# Patient Record
Sex: FEMALE | Race: WHITE | NOT HISPANIC OR LATINO
[De-identification: names, ages, dates, MRNs, and addresses within clinical notes are randomized per-mention and may not be internally consistent; named-entity substitution may affect disease eponyms.]

---

## 2017-06-21 ENCOUNTER — APPOINTMENT (OUTPATIENT)
Dept: OBGYN | Facility: HOSPITAL | Age: 42
End: 2017-06-21

## 2017-06-22 ENCOUNTER — CHART COPY (OUTPATIENT)
Age: 42
End: 2017-06-22

## 2017-08-01 ENCOUNTER — APPOINTMENT (OUTPATIENT)
Dept: OBGYN | Facility: HOSPITAL | Age: 42
End: 2017-08-01
Payer: MEDICARE

## 2017-08-01 ENCOUNTER — OUTPATIENT (OUTPATIENT)
Dept: OUTPATIENT SERVICES | Facility: HOSPITAL | Age: 42
LOS: 1 days | End: 2017-08-01

## 2017-08-01 VITALS
SYSTOLIC BLOOD PRESSURE: 111 MMHG | HEART RATE: 94 BPM | HEIGHT: 60 IN | DIASTOLIC BLOOD PRESSURE: 72 MMHG | BODY MASS INDEX: 17.68 KG/M2 | WEIGHT: 90.06 LBS

## 2017-08-01 DIAGNOSIS — F84.0 AUTISTIC DISORDER: ICD-10-CM

## 2017-08-01 DIAGNOSIS — Z01.419 ENCOUNTER FOR GYNECOLOGICAL EXAMINATION (GENERAL) (ROUTINE) W/OUT ABNORMAL FINDINGS: ICD-10-CM

## 2017-08-01 PROCEDURE — 99214 OFFICE O/P EST MOD 30 MIN: CPT

## 2017-08-02 DIAGNOSIS — F84.0 AUTISTIC DISORDER: ICD-10-CM

## 2017-08-02 DIAGNOSIS — Z01.419 ENCOUNTER FOR GYNECOLOGICAL EXAMINATION (GENERAL) (ROUTINE) WITHOUT ABNORMAL FINDINGS: ICD-10-CM

## 2017-08-02 DIAGNOSIS — Z30.41 ENCOUNTER FOR SURVEILLANCE OF CONTRACEPTIVE PILLS: ICD-10-CM

## 2018-01-22 ENCOUNTER — OUTPATIENT (OUTPATIENT)
Dept: OUTPATIENT SERVICES | Facility: HOSPITAL | Age: 43
LOS: 1 days | End: 2018-01-22

## 2018-01-22 VITALS
WEIGHT: 89.95 LBS | RESPIRATION RATE: 16 BRPM | HEART RATE: 90 BPM | SYSTOLIC BLOOD PRESSURE: 90 MMHG | DIASTOLIC BLOOD PRESSURE: 60 MMHG | HEIGHT: 60 IN

## 2018-01-22 DIAGNOSIS — F79 UNSPECIFIED INTELLECTUAL DISABILITIES: ICD-10-CM

## 2018-01-22 LAB
BUN SERPL-MCNC: 15 MG/DL — SIGNIFICANT CHANGE UP (ref 7–23)
CALCIUM SERPL-MCNC: 8.6 MG/DL — SIGNIFICANT CHANGE UP (ref 8.4–10.5)
CHLORIDE SERPL-SCNC: 102 MMOL/L — SIGNIFICANT CHANGE UP (ref 98–107)
CO2 SERPL-SCNC: 23 MMOL/L — SIGNIFICANT CHANGE UP (ref 22–31)
CREAT SERPL-MCNC: 0.55 MG/DL — SIGNIFICANT CHANGE UP (ref 0.5–1.3)
GLUCOSE SERPL-MCNC: 94 MG/DL — SIGNIFICANT CHANGE UP (ref 70–99)
HCG SERPL-ACNC: < 5 MIU/ML — SIGNIFICANT CHANGE UP
HCT VFR BLD CALC: 37.2 % — SIGNIFICANT CHANGE UP (ref 34.5–45)
HGB BLD-MCNC: 12.1 G/DL — SIGNIFICANT CHANGE UP (ref 11.5–15.5)
MCHC RBC-ENTMCNC: 30.2 PG — SIGNIFICANT CHANGE UP (ref 27–34)
MCHC RBC-ENTMCNC: 32.5 % — SIGNIFICANT CHANGE UP (ref 32–36)
MCV RBC AUTO: 92.8 FL — SIGNIFICANT CHANGE UP (ref 80–100)
NRBC # FLD: 0 — SIGNIFICANT CHANGE UP
PLATELET # BLD AUTO: 239 K/UL — SIGNIFICANT CHANGE UP (ref 150–400)
PMV BLD: 11 FL — SIGNIFICANT CHANGE UP (ref 7–13)
POTASSIUM SERPL-MCNC: 3.9 MMOL/L — SIGNIFICANT CHANGE UP (ref 3.5–5.3)
POTASSIUM SERPL-SCNC: 3.9 MMOL/L — SIGNIFICANT CHANGE UP (ref 3.5–5.3)
RBC # BLD: 4.01 M/UL — SIGNIFICANT CHANGE UP (ref 3.8–5.2)
RBC # FLD: 12.4 % — SIGNIFICANT CHANGE UP (ref 10.3–14.5)
SODIUM SERPL-SCNC: 139 MMOL/L — SIGNIFICANT CHANGE UP (ref 135–145)
WBC # BLD: 6.77 K/UL — SIGNIFICANT CHANGE UP (ref 3.8–10.5)
WBC # FLD AUTO: 6.77 K/UL — SIGNIFICANT CHANGE UP (ref 3.8–10.5)

## 2018-01-22 NOTE — H&P PST ADULT - PMH
Autism    Mental retardation  profound Autism    IBS (irritable bowel syndrome)    Mental retardation  profound

## 2018-01-22 NOTE — H&P PST ADULT - PROBLEM SELECTOR PLAN 1
scheduled examination under anesthesia, papsmear, pelvic sonogram, breast examination on 1/30/2018.   preop instructions sent to facility  pending copy of medical eval  unable to obtain temperature in PST  Brother Vijay Laguerre to sign consent  contact info [phone]

## 2018-01-22 NOTE — H&P PST ADULT - HISTORY OF PRESENT ILLNESS
42 y/o 43 y/o female with h/o profound mental retardation & autism presents for preop eval for scheduled examination under anesthesia, papsmear, pelvic sonogram, breast examination on 1/30/2018.  Pt accompanied by staff - Mallorie Lea.  Routine procedure per Annabella HALL.

## 2018-01-22 NOTE — H&P PST ADULT - NSANTHOSAYNRD_GEN_A_CORE
No. ROBBIE screening performed.  STOP BANG Legend: 0-2 = LOW Risk; 3-4 = INTERMEDIATE Risk; 5-8 = HIGH Risk

## 2018-01-30 ENCOUNTER — RESULT REVIEW (OUTPATIENT)
Age: 43
End: 2018-01-30

## 2018-01-30 ENCOUNTER — TRANSCRIPTION ENCOUNTER (OUTPATIENT)
Age: 43
End: 2018-01-30

## 2018-01-30 ENCOUNTER — OUTPATIENT (OUTPATIENT)
Dept: OUTPATIENT SERVICES | Facility: HOSPITAL | Age: 43
LOS: 1 days | Discharge: HOME | End: 2018-01-30
Payer: MEDICARE

## 2018-01-30 VITALS
HEIGHT: 60 IN | SYSTOLIC BLOOD PRESSURE: 107 MMHG | WEIGHT: 89.95 LBS | OXYGEN SATURATION: 100 % | HEART RATE: 89 BPM | RESPIRATION RATE: 16 BRPM | DIASTOLIC BLOOD PRESSURE: 63 MMHG | TEMPERATURE: 98 F

## 2018-01-30 VITALS
OXYGEN SATURATION: 100 % | SYSTOLIC BLOOD PRESSURE: 108 MMHG | TEMPERATURE: 98 F | DIASTOLIC BLOOD PRESSURE: 68 MMHG | RESPIRATION RATE: 16 BRPM | HEART RATE: 89 BPM

## 2018-01-30 DIAGNOSIS — F79 UNSPECIFIED INTELLECTUAL DISABILITIES: ICD-10-CM

## 2018-01-30 LAB
GRAM STN WND: SIGNIFICANT CHANGE UP
SPECIMEN SOURCE: SIGNIFICANT CHANGE UP

## 2018-01-30 PROCEDURE — 57410 PELVIC EXAMINATION: CPT

## 2018-01-30 PROCEDURE — 76857 US EXAM PELVIC LIMITED: CPT | Mod: 26

## 2018-01-30 NOTE — ASU DISCHARGE PLAN (ADULT/PEDIATRIC). - MEDICATION SUMMARY - MEDICATIONS TO TAKE
I will START or STAY ON the medications listed below when I get home from the hospital:    multivitamin with minerals  -- 1 tab daily  -- Indication: For as prescribed    Viorele  -- 28 day tablet - by mouth 1 tab daily   -- Indication: For as prescribed    insect repellent spray  -- apply to exposed body areas 3x/day prn  -- Indication: For as prescribed    sunscreen SPF 30  -- lotion - Apply generously to exposed areas - Reapply at 2 hr intervals prn  -- Indication: For as prescribed    clindamycin phosphate  -- 1% Lotion - Apply a thin layer on skin to face in am   -- Indication: For as prescribed    sodium sulf-sulfur cream  -- apply a samll amount on skin to lesions   -- Indication: For as prescribed    Docusil  -- 100 mg soft gel  1 cap po 3x/day   -- Indication: For as prescribed    naproxen 500 mg oral tablet  -- 1 tab(s) by mouth 2 times a day prn  -- Indication: For as prescribed    LORazepam 1 mg oral tablet  -- Take 1 tab po 1 hour prior to visit - prn  -- Indication: For as prescribed    PARoxetine 30 mg oral tablet  -- 1 tab(s) by mouth once a day  -- Indication: For as prescribed    selenium sulfide 2.5% topical shampoo  -- use daily   -- Indication: For as prescribed    clotrimazole 1% topical cream  -- Apply on skin to affected area 2 times a day PRN  -- Indication: For as prescribed    Blistex topical ointment  -- Apply on skin to affected area 2 times a day  -- Indication: For as prescribed    Retin-A 0.1% topical cream  -- Apply on skin to affected area once a day (at bedtime)  -- Indication: For as prescribed    triamcinolone 0.1% topical cream  -- apply on skin as directed 2x/day to face, arms, legs and back   -- Indication: For as prescribed    Linzess 145 mcg oral capsule  -- 1 cap(s) by mouth once a day  -- Indication: For as prescribed    Senna Lax 8.6 mg oral tablet  -- 2 tab(s) by mouth once a day (at bedtime)  -- Indication: For as prescribed    clindamycin 75 mg oral capsule  -- 1 cap(s) by mouth once a day   -- Finish all this medication unless otherwise directed by prescriber.  Medication should be taken with plenty of water.    -- Indication: For as prescribed I will START or STAY ON the medications listed below when I get home from the hospital:    multivitamin with minerals  -- 1 tab daily  -- Indication: For as prescribed    Viorele  -- 28 day tablet - by mouth 1 tab daily   -- Indication: For as prescribed    insect repellent spray  -- apply to exposed body areas 3x/day prn  -- Indication: For as prescribed    sunscreen SPF 30  -- lotion - Apply generously to exposed areas - Reapply at 2 hr intervals prn  -- Indication: For as prescribed    clindamycin phosphate  -- 1% Lotion - Apply a thin layer on skin to face in am   -- Indication: For as prescribed    sodium sulf-sulfur cream  -- apply a samll amount on skin to lesions   -- Indication: For as prescribed    Docusil  -- 100 mg soft gel  1 cap po 3x/day   -- Indication: For as prescribed    naproxen 500 mg oral tablet  -- 1 tab(s) by mouth 2 times a day prn  -- Indication: For as prescribed    LORazepam 1 mg oral tablet  -- Take 1 tab po 1 hour prior to visit - prn  -- Indication: For as prescribed    PARoxetine 30 mg oral tablet  -- 1 tab(s) by mouth once a day  -- Indication: For as prescribed    selenium sulfide 2.5% topical shampoo  -- use daily   -- Indication: For as prescribed    clotrimazole 1% topical cream  -- Apply on skin to affected area 2 times a day PRN  -- Indication: For as prescribed    Blistex topical ointment  -- Apply on skin to affected area 2 times a day  -- Indication: For as prescribed    Retin-A 0.1% topical cream  -- Apply on skin to affected area once a day (at bedtime)  -- Indication: For as prescribed    triamcinolone 0.1% topical cream  -- apply on skin as directed 2x/day to face, arms, legs and back   -- Indication: For as prescribed    Linzess 145 mcg oral capsule  -- 1 cap(s) by mouth once a day  -- Indication: For as prescribed    Senna Lax 8.6 mg oral tablet  -- 2 tab(s) by mouth once a day (at bedtime)  -- Indication: For as prescribed    Cleocin HCl 150 mg oral capsule  -- 1 cap(s) by mouth once a day   -- Finish all this medication unless otherwise directed by prescriber.  Medication should be taken with plenty of water.    -- Indication: For Infection

## 2018-01-30 NOTE — ASU DISCHARGE PLAN (ADULT/PEDIATRIC). - NOTIFY
Bleeding that does not stop/Persistent Nausea and Vomiting/Unable to Urinate/Pain not relieved by Medications

## 2018-01-30 NOTE — BRIEF OPERATIVE NOTE - PROCEDURE
<<-----Click on this checkbox to enter Procedure Exam under anesthesia  01/30/2018    Active  APEYSER

## 2018-01-31 LAB
C TRACH RRNA SPEC QL NAA+PROBE: SIGNIFICANT CHANGE UP
C TRACH+GC RRNA SPEC QL PROBE: SIGNIFICANT CHANGE UP
N GONORRHOEA RRNA SPEC QL NAA+PROBE: SIGNIFICANT CHANGE UP

## 2018-02-02 LAB — CULTURE - SURGICAL SITE: SIGNIFICANT CHANGE UP

## 2019-06-05 ENCOUNTER — RX RENEWAL (OUTPATIENT)
Age: 44
End: 2019-06-05

## 2020-02-20 PROBLEM — F84.0 AUTISTIC DISORDER: Chronic | Status: ACTIVE | Noted: 2018-01-22

## 2020-02-20 PROBLEM — K58.9 IRRITABLE BOWEL SYNDROME WITHOUT DIARRHEA: Chronic | Status: ACTIVE | Noted: 2018-01-22

## 2020-02-20 PROBLEM — F79 UNSPECIFIED INTELLECTUAL DISABILITIES: Chronic | Status: ACTIVE | Noted: 2018-01-22

## 2020-02-20 PROBLEM — K58.9 IRRITABLE BOWEL SYNDROME, UNSPECIFIED: Chronic | Status: ACTIVE | Noted: 2018-01-22

## 2020-03-10 ENCOUNTER — APPOINTMENT (OUTPATIENT)
Dept: OBGYN | Facility: HOSPITAL | Age: 45
End: 2020-03-10
Payer: MEDICARE

## 2020-03-10 ENCOUNTER — OUTPATIENT (OUTPATIENT)
Dept: OUTPATIENT SERVICES | Facility: HOSPITAL | Age: 45
LOS: 1 days | End: 2020-03-10

## 2020-03-10 VITALS
HEIGHT: 60 IN | WEIGHT: 94 LBS | SYSTOLIC BLOOD PRESSURE: 107 MMHG | HEART RATE: 96 BPM | DIASTOLIC BLOOD PRESSURE: 79 MMHG | BODY MASS INDEX: 18.46 KG/M2

## 2020-03-10 DIAGNOSIS — Z01.419 ENCOUNTER FOR GYNECOLOGICAL EXAMINATION (GENERAL) (ROUTINE) W/OUT ABNORMAL FINDINGS: ICD-10-CM

## 2020-03-10 DIAGNOSIS — Z30.41 ENCOUNTER FOR SURVEILLANCE OF CONTRACEPTIVE PILLS: ICD-10-CM

## 2020-03-10 PROCEDURE — 99213 OFFICE O/P EST LOW 20 MIN: CPT | Mod: GC

## 2020-03-10 RX ORDER — NAPROXEN 500 MG/1
500 TABLET ORAL TWICE DAILY
Qty: 60 | Refills: 2 | Status: ACTIVE | COMMUNITY
Start: 2020-03-10 | End: 1900-01-01

## 2020-03-11 DIAGNOSIS — Z30.41 ENCOUNTER FOR SURVEILLANCE OF CONTRACEPTIVE PILLS: ICD-10-CM

## 2020-03-11 DIAGNOSIS — Z00.00 ENCOUNTER FOR GENERAL ADULT MEDICAL EXAMINATION WITHOUT ABNORMAL FINDINGS: ICD-10-CM

## 2021-03-10 ENCOUNTER — APPOINTMENT (OUTPATIENT)
Dept: OBGYN | Facility: HOSPITAL | Age: 46
End: 2021-03-10
Payer: MEDICARE

## 2021-03-10 ENCOUNTER — OUTPATIENT (OUTPATIENT)
Dept: OUTPATIENT SERVICES | Facility: HOSPITAL | Age: 46
LOS: 1 days | End: 2021-03-10

## 2021-03-10 VITALS
BODY MASS INDEX: 18.65 KG/M2 | HEIGHT: 60 IN | SYSTOLIC BLOOD PRESSURE: 109 MMHG | WEIGHT: 95 LBS | DIASTOLIC BLOOD PRESSURE: 76 MMHG | HEART RATE: 91 BPM

## 2021-03-10 PROCEDURE — 99213 OFFICE O/P EST LOW 20 MIN: CPT | Mod: GC

## 2021-03-11 DIAGNOSIS — Z01.419 ENCOUNTER FOR GYNECOLOGICAL EXAMINATION (GENERAL) (ROUTINE) WITHOUT ABNORMAL FINDINGS: ICD-10-CM

## 2021-03-14 PROBLEM — Z01.419 WELL WOMAN EXAM WITH ROUTINE GYNECOLOGICAL EXAM: Status: ACTIVE | Noted: 2021-03-10

## 2021-03-14 NOTE — DISCUSSION/SUMMARY
[FreeTextEntry1] : 44 yo G0 with autism, developmental delay, non-verbal at baseline who lives in a group home presents for annual well-woman exam, accompanied by her aide. Patient cooperative with breast exam, which was unremarkable.  exam deferred due to patient's functional status. Patient not sexually active. \par - Recommend yearly mammogram \par - Pap smear (2018) - neg \par - Patient for EUA, Pap smear next year, to be booked next well woman visit per Dr. Mariano \par - OCP Rx refilled \par

## 2021-03-14 NOTE — PHYSICAL EXAM
[Alert] : alert [No Acute Distress] : no acute distress [Soft] : soft [Non-tender] : non-tender [Non-distended] : non-distended [Examination Of The Breasts] : a normal appearance [Normal] : normal [No Discharge] : no discharge [No Masses] : no breast masses were palpable [FreeTextEntry8] : nonverbal  [FreeTextEntry1] : deferred due to baseline functional status

## 2022-03-10 ENCOUNTER — OUTPATIENT (OUTPATIENT)
Dept: OUTPATIENT SERVICES | Facility: HOSPITAL | Age: 47
LOS: 1 days | End: 2022-03-10

## 2022-03-10 ENCOUNTER — APPOINTMENT (OUTPATIENT)
Dept: OBGYN | Facility: HOSPITAL | Age: 47
End: 2022-03-10
Payer: MEDICARE

## 2022-03-10 VITALS — BODY MASS INDEX: 18.06 KG/M2 | WEIGHT: 92 LBS | HEIGHT: 60 IN

## 2022-03-10 DIAGNOSIS — Z86.59 PERSONAL HISTORY OF OTHER MENTAL AND BEHAVIORAL DISORDERS: ICD-10-CM

## 2022-03-10 DIAGNOSIS — F72 SEVERE INTELLECTUAL DISABILITIES: ICD-10-CM

## 2022-03-10 DIAGNOSIS — Z00.00 ENCOUNTER FOR GENERAL ADULT MEDICAL EXAMINATION W/OUT ABNORMAL FINDINGS: ICD-10-CM

## 2022-03-10 PROCEDURE — 99213 OFFICE O/P EST LOW 20 MIN: CPT | Mod: GE

## 2022-03-10 RX ORDER — LINACLOTIDE 145 UG/1
145 CAPSULE, GELATIN COATED ORAL
Refills: 0 | Status: ACTIVE | COMMUNITY

## 2022-03-10 RX ORDER — DESOGESTREL AND ETHINYL ESTRADIOL AND ETHINYL ESTRADIOL 21-5 (28)
0.15-0.02/0.01 KIT ORAL DAILY
Qty: 28 | Refills: 11 | Status: DISCONTINUED | COMMUNITY
Start: 2017-06-22 | End: 2022-03-10

## 2022-03-11 DIAGNOSIS — Z00.00 ENCOUNTER FOR GENERAL ADULT MEDICAL EXAMINATION WITHOUT ABNORMAL FINDINGS: ICD-10-CM

## 2022-03-20 NOTE — HISTORY OF PRESENT ILLNESS
[FreeTextEntry1] : 44 yo G0 LMP 09/27 with autism, developmental delay, non-verbal at baseline who lives in a group home presents for annual well-woman exam, accompanied by her aide. Per aide, patient is not sexually active. She is on OCPs with light irregular periods. Per aide, no abnormal uterine bleeding she has had irregular menses every 3-4 months with very light bleeding when it occurs.Per aid, no vaginal discharge, fevers, chills, NVD.\par \par OBHx: N/A \par Gyn: no known hx of ovarian cysts, fibroids, abnl pap smears \par HCM: \par - Pap 2018: NILM \par - Mammogram 2016: attempted but could not be performed due to patient's inability to cooperate with exam. \par PMHx: denies\par Meds: Viorele, colace, Linzess 145, Naproxen PRN, paxil 30, sodium sulfur, vitamin, tretinoin cream, triamcinolone cream 0.1%, vitamin D \par SHx: N/A\par Psych: Profound intellectual disability, autistic disorder\par Social: no alcohol, tobacco, illicit substance use\par All: Penicillin -> unsure of reaction

## 2022-05-17 ENCOUNTER — OUTPATIENT (OUTPATIENT)
Dept: OUTPATIENT SERVICES | Facility: HOSPITAL | Age: 47
LOS: 1 days | End: 2022-05-17

## 2022-05-17 VITALS
SYSTOLIC BLOOD PRESSURE: 110 MMHG | HEART RATE: 84 BPM | WEIGHT: 93.04 LBS | TEMPERATURE: 97 F | RESPIRATION RATE: 16 BRPM | HEIGHT: 60 IN | DIASTOLIC BLOOD PRESSURE: 80 MMHG | OXYGEN SATURATION: 97 %

## 2022-05-17 DIAGNOSIS — F84.0 AUTISTIC DISORDER: ICD-10-CM

## 2022-05-17 DIAGNOSIS — F79 UNSPECIFIED INTELLECTUAL DISABILITIES: ICD-10-CM

## 2022-05-17 LAB — HCG SERPL-ACNC: <5 MIU/ML — SIGNIFICANT CHANGE UP

## 2022-05-17 RX ORDER — MULTIVIT-MIN/FERROUS GLUCONATE 9 MG/15 ML
0 LIQUID (ML) ORAL
Qty: 0 | Refills: 0 | DISCHARGE

## 2022-05-17 RX ORDER — SELENIUM SULFIDE/ALOE VERA 1 %
0 SHAMPOO TOPICAL
Qty: 0 | Refills: 0 | DISCHARGE

## 2022-05-17 RX ORDER — SENNA PLUS 8.6 MG/1
1 TABLET ORAL
Qty: 0 | Refills: 0 | DISCHARGE

## 2022-05-17 RX ORDER — DOCUSATE SODIUM 100 MG
0 CAPSULE ORAL
Qty: 0 | Refills: 0 | DISCHARGE

## 2022-05-17 RX ORDER — SODIUM CHLORIDE 9 MG/ML
1000 INJECTION, SOLUTION INTRAVENOUS
Refills: 0 | Status: DISCONTINUED | OUTPATIENT
Start: 2022-05-26 | End: 2022-06-09

## 2022-05-17 RX ORDER — DESOGESTREL AND ETHINYL ESTRADIOL 0.15-0.03
0 KIT ORAL
Qty: 0 | Refills: 0 | DISCHARGE

## 2022-05-17 RX ORDER — DIMETHICONE, OXYBENZONE, AND PADIMATE O 2; 2.5; 6.6 G/100G; G/100G; G/100G
1 STICK TOPICAL
Qty: 0 | Refills: 0 | DISCHARGE

## 2022-05-17 RX ORDER — LINACLOTIDE 145 UG/1
1 CAPSULE, GELATIN COATED ORAL
Qty: 0 | Refills: 0 | DISCHARGE

## 2022-05-17 NOTE — H&P PST ADULT - HISTORY OF PRESENT ILLNESS
Pt is a 46 yr old female scheduled for EUA Pap Smear with Dr Cody tentatively 5/26/22 - pt lives in group home and has recently had birth control stopped and needs exam for screening - caregive denies vaginal bleeding or pain - pt hx autism and mental disability, dysphagia - brother give consent - Vijay Laguerre [phone]   Patient instructed to contact surgeon's office concerning COVID test prior to surgery

## 2022-05-17 NOTE — H&P PST ADULT - PROBLEM SELECTOR PLAN 1
Pt scheduled for surgery and preop instructions including instructions for taking Famotidine crushed with water only on the day of surgery, given verbally and with use of  written materials, and patient confirming understanding of such instructions using  teach back method.  Request MC from PCP - Caregiver given instructions  Brother to sign consent for procedure

## 2022-05-17 NOTE — H&P PST ADULT - PRO ARRIVE FROM
home Group home - [facility] [address] [facility] [place] RITESH Ford Small Manager [phone]/group home

## 2022-05-17 NOTE — H&P PST ADULT - PSYCHIATRIC COMMENTS
Hx autism and mental disability Pt hx autism, mental disability - pt is non-verbal and unable to follow commands

## 2022-05-24 ENCOUNTER — NON-APPOINTMENT (OUTPATIENT)
Age: 47
End: 2022-05-24

## 2022-05-25 ENCOUNTER — TRANSCRIPTION ENCOUNTER (OUTPATIENT)
Age: 47
End: 2022-05-25

## 2022-05-25 NOTE — ASU PATIENT PROFILE, ADULT - PRO ARRIVE FROM
Group home - Psych [address] [facility] [place] RITESH Ford Small Manager [phone]/group home

## 2022-05-25 NOTE — ASU PATIENT PROFILE, ADULT - FALL HARM RISK - UNIVERSAL INTERVENTIONS
Bed in lowest position, wheels locked, appropriate side rails in place/Call bell, personal items and telephone in reach/Instruct patient to call for assistance before getting out of bed or chair/Non-slip footwear when patient is out of bed/[place] to call system/Physically safe environment - no spills, clutter or unnecessary equipment/Purposeful Proactive Rounding/Room/bathroom lighting operational, light cord in reach

## 2022-05-25 NOTE — ASU PATIENT PROFILE, ADULT - NSICDXPASTMEDICALHX_GEN_ALL_CORE_FT
PAST MEDICAL HISTORY:  Autism     IBS (irritable bowel syndrome)     Mental retardation profound

## 2022-05-26 ENCOUNTER — RESULT REVIEW (OUTPATIENT)
Age: 47
End: 2022-05-26

## 2022-05-26 ENCOUNTER — OUTPATIENT (OUTPATIENT)
Dept: OUTPATIENT SERVICES | Facility: HOSPITAL | Age: 47
LOS: 1 days | Discharge: HOME | End: 2022-05-26
Payer: MEDICARE

## 2022-05-26 ENCOUNTER — TRANSCRIPTION ENCOUNTER (OUTPATIENT)
Age: 47
End: 2022-05-26

## 2022-05-26 VITALS
SYSTOLIC BLOOD PRESSURE: 119 MMHG | OXYGEN SATURATION: 99 % | HEART RATE: 81 BPM | DIASTOLIC BLOOD PRESSURE: 78 MMHG | RESPIRATION RATE: 20 BRPM

## 2022-05-26 VITALS — WEIGHT: 93.04 LBS | HEIGHT: 60 IN

## 2022-05-26 DIAGNOSIS — F84.0 AUTISTIC DISORDER: ICD-10-CM

## 2022-05-26 PROCEDURE — 57410 PELVIC EXAMINATION: CPT | Mod: GC

## 2022-05-26 RX ORDER — DOCUSATE SODIUM 100 MG
1 CAPSULE ORAL
Qty: 0 | Refills: 0 | DISCHARGE

## 2022-05-26 RX ORDER — SODIUM CHLORIDE 9 MG/ML
1000 INJECTION, SOLUTION INTRAVENOUS
Refills: 0 | Status: DISCONTINUED | OUTPATIENT
Start: 2022-05-26 | End: 2022-06-09

## 2022-05-26 RX ORDER — DIMETHICONE, OXYBENZONE, AND PADIMATE O 2; 2.5; 6.6 G/100G; G/100G; G/100G
1 STICK TOPICAL
Qty: 0 | Refills: 0 | DISCHARGE

## 2022-05-26 RX ORDER — CHOLECALCIFEROL (VITAMIN D3) 125 MCG
1 CAPSULE ORAL
Qty: 0 | Refills: 0 | DISCHARGE

## 2022-05-26 NOTE — ASU DISCHARGE PLAN (ADULT/PEDIATRIC) - CARE PROVIDER_API CALL
[facility],   [facility]   Oncology Building, Basement Level   [address] [address]  Phone: [phone]  Fax: (   )    -  Established Patient  Follow Up Time: 2 weeks

## 2022-05-26 NOTE — BRIEF OPERATIVE NOTE - NSICDXBRIEFPROCEDURE_GEN_ALL_CORE_FT
PROCEDURES:  Pap smear thin prep 26-May-2022 13:56:43  Judy Harper  Exam under anesthesia, pelvic 26-May-2022 13:57:01  Judy Harper

## 2022-05-26 NOTE — BRIEF OPERATIVE NOTE - OPERATION/FINDINGS
EUA: Vulva grossly normal, no external masses or lesions. Mobile uterus, midline and deviated to left, approx 6-8 wks in size. No uterine or adnexal masses palpated   Moderate bleeding in vaginal vault. Cervix grossly normal. No masses or lesions visible   Breast exam: no masses, lesions, nipple retraction/discharge. No axillary lymphadenopathy

## 2022-05-26 NOTE — ASU PREOP CHECKLIST - BSA (M2)
1.35
Detail Level: Zone
Plan: Apply Protopic 2 x daily, as needed, when flaring.\\n\\nIf stings, may mix with CeraVe Ointment
Show Eltamd Line: Yes
Action 1: Continue
Other Instructions: Apply Clobetasol 2 x daily 2 weeks on 1 week off, as needed

## 2022-05-26 NOTE — ASU PREOP CHECKLIST - 1.
unable to obtain vital signs, pt will not cooperate, biting herself when staff approach her - Dr. Martinez and Dr. Cody aware

## 2022-05-26 NOTE — ASU DISCHARGE PLAN (ADULT/PEDIATRIC) - ASU DC SPECIAL INSTRUCTIONSFT
Discharge Instructions:  1. Diet: advance as tolerated  2. No limitations on activity; you may return to your day to day activities   3. Medications:   - Ibuprofen 600mg every 6 hours as needed for pain  - Acetaminophen 500mg every 4 hours as needed for pain  4. Follow-up appointment - 2 weeks. Please call the office upon discharge to schedule your appointment if you do not have one already.   5. Precautions:  - Call the office or go to the ED if you have any of the followin) Fever >100.4 that does not resolve  2) Intractable pain  3) Heavy bleeding

## 2022-06-01 LAB — CYTOLOGY SPEC DOC CYTO: SIGNIFICANT CHANGE UP

## 2022-06-08 ENCOUNTER — OUTPATIENT (OUTPATIENT)
Dept: OUTPATIENT SERVICES | Facility: HOSPITAL | Age: 47
LOS: 1 days | End: 2022-06-08

## 2022-06-08 ENCOUNTER — APPOINTMENT (OUTPATIENT)
Dept: OBGYN | Facility: HOSPITAL | Age: 47
End: 2022-06-08

## 2022-06-08 DIAGNOSIS — F79 UNSPECIFIED INTELLECTUAL DISABILITIES: ICD-10-CM

## 2022-06-08 PROCEDURE — 99212 OFFICE O/P EST SF 10 MIN: CPT | Mod: GC

## 2022-06-22 NOTE — HISTORY OF PRESENT ILLNESS
[Pathology reviewed] : pathology reviewed [Fever] : no fever [Chills] : no chills [Vaginal Bleeding] : no vaginal bleeding [Vaginal Discharge] : no vaginal discharge

## 2022-06-22 NOTE — REASON FOR VISIT
[Post Op Day: ___] : Post-Op Day:  #[unfilled] [Procedure: ___] : Procedure: [unfilled] [Formal Caregiver] : formal caregiver [de-identified] : E

## 2023-12-24 NOTE — ASU DISCHARGE PLAN (ADULT/PEDIATRIC) - NS MD DC FALL RISK RISK
For information on Fall & Injury Prevention, visit: [email or web address] OR  [email or web address] OR  https://www.cdc.gov/steadi/patient.html Female

## 2024-01-15 NOTE — ASU DISCHARGE PLAN (ADULT/PEDIATRIC) - PROVIDER TOKENS
10:13am: pt verified x2, spoke with central scheduling to verify PET CT order is in the computer. Stated that it was. No further questions.    FREE:[LAST:[[facility]],PHONE:[phone]],FAX:[(   )    -],ADDRESS:[[facility]   Oncology [facility], Basement Level   [address] [place] [address]],FOLLOWUP:[2 weeks],ESTABLISHEDPATIENT:[T]]

## 2025-05-29 ENCOUNTER — RESULT REVIEW (OUTPATIENT)
Age: 50
End: 2025-05-29

## 2025-05-29 ENCOUNTER — APPOINTMENT (OUTPATIENT)
Dept: OBGYN | Facility: HOSPITAL | Age: 50
End: 2025-05-29

## 2025-05-29 ENCOUNTER — OUTPATIENT (OUTPATIENT)
Dept: OUTPATIENT SERVICES | Facility: HOSPITAL | Age: 50
LOS: 1 days | End: 2025-05-29

## 2025-05-29 VITALS — HEIGHT: 60 IN | TEMPERATURE: 98 F | BODY MASS INDEX: 18.16 KG/M2 | WEIGHT: 92.5 LBS

## 2025-05-29 DIAGNOSIS — N93.9 ABNORMAL UTERINE AND VAGINAL BLEEDING, UNSPECIFIED: ICD-10-CM

## 2025-05-29 DIAGNOSIS — Z01.419 ENCOUNTER FOR GYNECOLOGICAL EXAMINATION (GENERAL) (ROUTINE) W/OUT ABNORMAL FINDINGS: ICD-10-CM

## 2025-05-29 LAB — TSH SERPL-MCNC: 2.89 UIU/ML — SIGNIFICANT CHANGE UP (ref 0.27–4.2)

## 2025-05-29 PROCEDURE — 99396 PREV VISIT EST AGE 40-64: CPT | Mod: GY,25,GC

## 2025-05-29 PROCEDURE — 99213 OFFICE O/P EST LOW 20 MIN: CPT | Mod: GC,25

## 2025-05-30 LAB
FSH SERPL-MCNC: 12.7 IU/L — SIGNIFICANT CHANGE UP
PROLACTIN SERPL-MCNC: 23.9 NG/ML — SIGNIFICANT CHANGE UP (ref 3.4–24.1)
TESTOST FREE+TOTAL PANEL SERPL-MCNC: 4.8 NG/DL — LOW (ref 8.4–48.1)

## 2025-06-03 DIAGNOSIS — N93.9 ABNORMAL UTERINE AND VAGINAL BLEEDING, UNSPECIFIED: ICD-10-CM

## 2025-06-03 DIAGNOSIS — Z01.419 ENCOUNTER FOR GYNECOLOGICAL EXAMINATION (GENERAL) (ROUTINE) WITHOUT ABNORMAL FINDINGS: ICD-10-CM

## 2025-07-21 ENCOUNTER — OUTPATIENT (OUTPATIENT)
Dept: OUTPATIENT SERVICES | Facility: HOSPITAL | Age: 50
LOS: 1 days | End: 2025-07-21

## 2025-07-21 VITALS
HEIGHT: 60 IN | SYSTOLIC BLOOD PRESSURE: 107 MMHG | TEMPERATURE: 97 F | HEART RATE: 90 BPM | WEIGHT: 92.59 LBS | OXYGEN SATURATION: 100 % | DIASTOLIC BLOOD PRESSURE: 77 MMHG | RESPIRATION RATE: 16 BRPM

## 2025-07-21 DIAGNOSIS — N93.9 ABNORMAL UTERINE AND VAGINAL BLEEDING, UNSPECIFIED: ICD-10-CM

## 2025-07-21 RX ORDER — MUPIROCIN CALCIUM 20 MG/G
1 CREAM TOPICAL
Refills: 0 | DISCHARGE

## 2025-07-21 RX ORDER — SODIUM CHLORIDE 9 G/1000ML
1000 INJECTION, SOLUTION INTRAVENOUS
Refills: 0 | Status: DISCONTINUED | OUTPATIENT
Start: 2025-07-24 | End: 2025-08-07

## 2025-07-23 ENCOUNTER — NON-APPOINTMENT (OUTPATIENT)
Age: 50
End: 2025-07-23

## 2025-07-24 ENCOUNTER — OUTPATIENT (OUTPATIENT)
Dept: INPATIENT UNIT | Facility: HOSPITAL | Age: 50
LOS: 1 days | End: 2025-07-24
Payer: MEDICARE

## 2025-07-24 ENCOUNTER — APPOINTMENT (OUTPATIENT)
Dept: OBGYN | Facility: HOSPITAL | Age: 50
End: 2025-07-24

## 2025-07-24 ENCOUNTER — RESULT REVIEW (OUTPATIENT)
Age: 50
End: 2025-07-24

## 2025-07-24 ENCOUNTER — TRANSCRIPTION ENCOUNTER (OUTPATIENT)
Age: 50
End: 2025-07-24

## 2025-07-24 VITALS
OXYGEN SATURATION: 99 % | DIASTOLIC BLOOD PRESSURE: 78 MMHG | SYSTOLIC BLOOD PRESSURE: 114 MMHG | TEMPERATURE: 98 F | HEART RATE: 79 BPM | RESPIRATION RATE: 16 BRPM

## 2025-07-24 VITALS
DIASTOLIC BLOOD PRESSURE: 80 MMHG | RESPIRATION RATE: 16 BRPM | SYSTOLIC BLOOD PRESSURE: 103 MMHG | TEMPERATURE: 98 F | HEART RATE: 90 BPM | OXYGEN SATURATION: 96 %

## 2025-07-24 DIAGNOSIS — N93.9 ABNORMAL UTERINE AND VAGINAL BLEEDING, UNSPECIFIED: ICD-10-CM

## 2025-07-24 LAB — HCG UR QL: NEGATIVE — SIGNIFICANT CHANGE UP

## 2025-07-24 PROCEDURE — 58558 HYSTEROSCOPY BIOPSY: CPT | Mod: GC

## 2025-07-24 PROCEDURE — 88305 TISSUE EXAM BY PATHOLOGIST: CPT | Mod: 26

## 2025-07-24 DEVICE — BIRTH CONTROL IUD MIRENA: Type: IMPLANTABLE DEVICE | Status: FUNCTIONAL

## 2025-07-24 RX ORDER — SULFACETAMIDE SODIUM 10 %
1 CREAM, EXTENDED RELEASE (GRAM) TOPICAL
Refills: 0 | DISCHARGE

## 2025-07-24 RX ORDER — HYDROCORTISONE 10 MG/G
1 CREAM TOPICAL
Refills: 0 | DISCHARGE

## 2025-07-24 RX ORDER — TRIAMCINOLONE ACETONIDE 1 MG/G
1 CREAM TOPICAL
Refills: 0 | DISCHARGE

## 2025-07-24 RX ORDER — KETOROLAC TROMETHAMINE 30 MG/ML
15 INJECTION, SOLUTION INTRAMUSCULAR; INTRAVENOUS ONCE
Refills: 0 | Status: DISCONTINUED | OUTPATIENT
Start: 2025-07-24 | End: 2025-07-24

## 2025-07-24 RX ORDER — CYST/ALA/Q10/PHOS.SER/DHA/BROC 100-20-50
1 POWDER (GRAM) ORAL
Refills: 0 | DISCHARGE

## 2025-07-24 RX ORDER — ONDANSETRON HCL/PF 4 MG/2 ML
4 VIAL (ML) INJECTION ONCE
Refills: 0 | Status: DISCONTINUED | OUTPATIENT
Start: 2025-07-24 | End: 2025-08-07

## 2025-07-24 RX ORDER — DOCUSATE SODIUM 100 MG
1 CAPSULE ORAL
Refills: 0 | DISCHARGE

## 2025-07-24 RX ORDER — LINACLOTIDE 290 UG/1
1 CAPSULE, GELATIN COATED ORAL
Refills: 0 | DISCHARGE

## 2025-07-24 RX ORDER — ACETAMINOPHEN 500 MG/5ML
630 LIQUID (ML) ORAL ONCE
Refills: 0 | Status: DISCONTINUED | OUTPATIENT
Start: 2025-07-24 | End: 2025-08-07

## 2025-07-24 RX ORDER — BENZOYL PEROXIDE 5 %
0 GEL (GRAM) TOPICAL
Refills: 0 | DISCHARGE

## 2025-07-24 RX ORDER — NAPROXEN SODIUM 275 MG
1 TABLET ORAL
Refills: 0 | DISCHARGE

## 2025-07-24 RX ADMIN — KETOROLAC TROMETHAMINE 15 MILLIGRAM(S): 30 INJECTION, SOLUTION INTRAMUSCULAR; INTRAVENOUS at 12:23

## 2025-07-26 LAB — HPV HIGH+LOW RISK DNA PNL CVX: SIGNIFICANT CHANGE UP

## 2025-07-28 LAB — CYTOLOGY SPEC DOC CYTO: SIGNIFICANT CHANGE UP

## 2025-07-29 LAB — SURGICAL PATHOLOGY STUDY: SIGNIFICANT CHANGE UP

## 2025-08-07 ENCOUNTER — APPOINTMENT (OUTPATIENT)
Dept: OBGYN | Facility: HOSPITAL | Age: 50
End: 2025-08-07
Payer: MEDICARE

## 2025-08-07 ENCOUNTER — OUTPATIENT (OUTPATIENT)
Dept: OUTPATIENT SERVICES | Facility: HOSPITAL | Age: 50
LOS: 1 days | End: 2025-08-07
Payer: MEDICARE

## 2025-08-07 VITALS
BODY MASS INDEX: 18.06 KG/M2 | HEART RATE: 89 BPM | SYSTOLIC BLOOD PRESSURE: 117 MMHG | HEIGHT: 60 IN | DIASTOLIC BLOOD PRESSURE: 81 MMHG | WEIGHT: 92 LBS | TEMPERATURE: 98.1 F

## 2025-08-07 DIAGNOSIS — Z01.419 ENCOUNTER FOR GYNECOLOGICAL EXAMINATION (GENERAL) (ROUTINE) W/OUT ABNORMAL FINDINGS: ICD-10-CM

## 2025-08-07 PROCEDURE — 58558 HYSTEROSCOPY BIOPSY: CPT | Mod: GC

## 2025-08-07 PROCEDURE — 99024 POSTOP FOLLOW-UP VISIT: CPT

## 2025-08-08 DIAGNOSIS — Z01.419 ENCOUNTER FOR GYNECOLOGICAL EXAMINATION (GENERAL) (ROUTINE) WITHOUT ABNORMAL FINDINGS: ICD-10-CM

## (undated) DEVICE — WARMING BLANKET UPPER ADULT

## (undated) DEVICE — VISITEC 4X4

## (undated) DEVICE — PACK PERI GYN

## (undated) DEVICE — DRAPE WARMING SOLUTION 44 X 44"

## (undated) DEVICE — LABELS BLANK W PEN

## (undated) DEVICE — DRAPE TOWEL BLUE 17" X 24"

## (undated) DEVICE — PACK D&C

## (undated) DEVICE — DRSG TELFA 3 X 8

## (undated) DEVICE — VENODYNE/SCD SLEEVE CALF MEDIUM

## (undated) DEVICE — PRESSURE INFUSOR BAG 1000ML

## (undated) DEVICE — POSITIONER STRAP ARMBOARD VELCRO TS-30

## (undated) DEVICE — PROTECTOR HEEL / ELBOW FLUFFY

## (undated) DEVICE — GOWN LG

## (undated) DEVICE — DRAPE LIGHT HANDLE COVER (GREEN)

## (undated) DEVICE — SOL IRR POUR H2O 500ML

## (undated) DEVICE — SOL IRR POUR NS 0.9% 1000ML

## (undated) DEVICE — PREP BETADINE SPONGE STICKS

## (undated) DEVICE — TUBING IRR SET FOR CYSTOSCOPY 77"

## (undated) DEVICE — BASIN SET DOUBLE

## (undated) DEVICE — GLV 6.5 PROTEXIS (CREAM) MICRO

## (undated) DEVICE — DRSG PAD SANITARY OB

## (undated) DEVICE — PREP BETADINE KIT

## (undated) DEVICE — TUBING SUCTION NONCONDUCTIVE 6MM X 12FT

## (undated) DEVICE — DRAPE GYN IRRIGATION POUCH 19 X 23"

## (undated) DEVICE — ELCTR BOVIE PENCIL SMOKE EVACUATION

## (undated) DEVICE — GLV 7 PROTEXIS (CREAM) MICRO